# Patient Record
Sex: MALE | Race: WHITE | NOT HISPANIC OR LATINO | Employment: FULL TIME | URBAN - METROPOLITAN AREA
[De-identification: names, ages, dates, MRNs, and addresses within clinical notes are randomized per-mention and may not be internally consistent; named-entity substitution may affect disease eponyms.]

---

## 2022-03-26 ENCOUNTER — OFFICE VISIT (OUTPATIENT)
Dept: URGENT CARE | Facility: PHYSICIAN GROUP | Age: 62
End: 2022-03-26
Payer: COMMERCIAL

## 2022-03-26 ENCOUNTER — HOSPITAL ENCOUNTER (OUTPATIENT)
Dept: RADIOLOGY | Facility: MEDICAL CENTER | Age: 62
End: 2022-03-26
Attending: PHYSICIAN ASSISTANT
Payer: COMMERCIAL

## 2022-03-26 VITALS
OXYGEN SATURATION: 94 % | SYSTOLIC BLOOD PRESSURE: 152 MMHG | RESPIRATION RATE: 20 BRPM | TEMPERATURE: 97.6 F | DIASTOLIC BLOOD PRESSURE: 64 MMHG | BODY MASS INDEX: 33.86 KG/M2 | HEIGHT: 72 IN | WEIGHT: 250 LBS | HEART RATE: 81 BPM

## 2022-03-26 DIAGNOSIS — S92.512A DISPLACED FRACTURE OF PROXIMAL PHALANX OF LEFT LESSER TOE(S), INITIAL ENCOUNTER FOR CLOSED FRACTURE: ICD-10-CM

## 2022-03-26 DIAGNOSIS — S99.922A INJURY OF TOE ON LEFT FOOT, INITIAL ENCOUNTER: ICD-10-CM

## 2022-03-26 PROCEDURE — 73660 X-RAY EXAM OF TOE(S): CPT | Mod: LT

## 2022-03-26 PROCEDURE — 99204 OFFICE O/P NEW MOD 45 MIN: CPT | Performed by: PHYSICIAN ASSISTANT

## 2022-03-26 NOTE — PROGRESS NOTES
Subjective:   Taurus Miles is a 62 y.o. male who presents for Toe Pain (Left foot; pinky toe- today )      HPI  Patient is a 62-year-old male who presents to clinic with complaints of left fifth toe pain onset today.  He states he kicked a wall and developed pain immediately to his fifth toe.  He notes a slight deformity laterally to his fifth toe.  He notes swelling.  The pain is worse with weightbearing and movement of the toe.  He has some numbness to the toe.        Medications:    • This patient does not have an active medication from one of the medication groupers.    Allergies: Patient has no allergy information on record.    Problem List: Taurus Miles does not have a problem list on file.    Surgical History:  No past surgical history on file.    Past Social Hx: Taurus Miles  reports that he has been smoking cigarettes. He has never used smokeless tobacco. He reports current alcohol use. He reports previous drug use.     Past Family Hx:  Taurus Miles family history is not on file.     Problem list, medications, and allergies reviewed by myself today in Epic.     Objective:     /64 (BP Location: Left arm, Patient Position: Sitting, BP Cuff Size: Adult)   Pulse 81   Temp 36.4 °C (97.6 °F) (Temporal)   Resp 20   Ht 1.829 m (6')   Wt 113 kg (250 lb)   SpO2 94%   BMI 33.91 kg/m²     Physical Exam  Vitals reviewed.   Constitutional:       General: He is not in acute distress.     Appearance: Normal appearance. He is not ill-appearing or toxic-appearing.   Eyes:      Conjunctiva/sclera: Conjunctivae normal.      Pupils: Pupils are equal, round, and reactive to light.   Cardiovascular:      Rate and Rhythm: Normal rate.   Pulmonary:      Effort: Pulmonary effort is normal.   Musculoskeletal:      Cervical back: Neck supple.   Feet:      Comments: Left fifth toe: Limited ROM secondary to swelling.  Tenderness to the proximal toe.  Slight lateral deformity.  Mild to moderate edema.  Skin intact.   Sensation intact.  Capillary refill less than 2 seconds.  No other tenderness to the foot.  Skin:     General: Skin is warm and dry.   Neurological:      General: No focal deficit present.      Mental Status: He is alert and oriented to person, place, and time.   Psychiatric:         Mood and Affect: Mood normal.         Behavior: Behavior normal.         RADIOLOGY RESULTS   DX-TOE(S) 2+ LEFT    Result Date: 3/26/2022  3/26/2022 12:54 PM HISTORY/REASON FOR EXAM:  Pain following trauma. TECHNIQUE/EXAM DESCRIPTION AND NUMBER OF VIEWS:  3 views of the LEFT toes. COMPARISON: None FINDINGS: BONE MINERALIZATION: Normal. JOINTS: Preserved. No erosions. FRACTURE: Obliquely oriented, minimally displaced fracture of the fifth proximal phalanx. No definitive intra-articular extension. DISLOCATION: None. SOFT TISSUES: No mass.     Obliquely oriented, minimally displaced fracture of the fifth proximal phalanx. No definitive intra-articular extension.           Diagnosis and associated orders:     1. Displaced fracture of proximal phalanx of left lesser toe(s), initial encounter for closed fracture  - DX-TOE(S) 2+ LEFT; Future  - Referral to Orthopedics       Comments/MDM:     • X-ray results per radiologist interpretation above. I personally reviewed images and radiologist report which showed obliquely oriented, minimally displaced fracture of the fifth proximal phalanx.  Discussed findings with patient.  Patient's toe omar taped and he was placed in a short hard sole boot.  Rest, ice application, elevation, high-dose ibuprofen.  • Urgent referral to orthopedics.       I personally reviewed prior external notes and test results pertinent to today's visit.  Supportive care, natural history, differential diagnoses, and indications for immediate follow-up discussed. Patient expresses understanding and agrees to plan. Patient denies any other questions or concerns.     Follow-up with the primary care physician for recheck,  reevaluation, and consideration of further management.    Please note that this dictation was created using voice recognition software. I have made a reasonable attempt to correct obvious errors, but I expect that there are errors of grammar and possibly content that I did not discover before finalizing the note.    This note was electronically signed by Otoniel Barragan PA-C

## 2023-02-27 ENCOUNTER — HOSPITAL ENCOUNTER (OUTPATIENT)
Dept: RADIOLOGY | Facility: MEDICAL CENTER | Age: 63
End: 2023-02-27
Attending: NURSE PRACTITIONER
Payer: COMMERCIAL

## 2023-02-27 ENCOUNTER — OFFICE VISIT (OUTPATIENT)
Dept: URGENT CARE | Facility: PHYSICIAN GROUP | Age: 63
End: 2023-02-27
Payer: COMMERCIAL

## 2023-02-27 VITALS
WEIGHT: 240 LBS | TEMPERATURE: 97.3 F | HEIGHT: 72 IN | RESPIRATION RATE: 16 BRPM | OXYGEN SATURATION: 96 % | HEART RATE: 70 BPM | SYSTOLIC BLOOD PRESSURE: 134 MMHG | DIASTOLIC BLOOD PRESSURE: 74 MMHG | BODY MASS INDEX: 32.51 KG/M2

## 2023-02-27 DIAGNOSIS — S99.911A RIGHT ANKLE INJURY, INITIAL ENCOUNTER: ICD-10-CM

## 2023-02-27 DIAGNOSIS — S82.54XA CLOSED NONDISPLACED FRACTURE OF MEDIAL MALLEOLUS OF RIGHT TIBIA, INITIAL ENCOUNTER: ICD-10-CM

## 2023-02-27 PROCEDURE — 73610 X-RAY EXAM OF ANKLE: CPT | Mod: RT

## 2023-02-27 PROCEDURE — 99214 OFFICE O/P EST MOD 30 MIN: CPT | Performed by: NURSE PRACTITIONER

## 2023-02-27 RX ORDER — HYDROCODONE BITARTRATE AND ACETAMINOPHEN 5; 325 MG/1; MG/1
2 TABLET ORAL EVERY 6 HOURS PRN
Qty: 24 TABLET | Refills: 0 | Status: SHIPPED | OUTPATIENT
Start: 2023-02-27 | End: 2023-03-02

## 2023-02-27 ASSESSMENT — ENCOUNTER SYMPTOMS
FEVER: 0
FOCAL WEAKNESS: 0
MYALGIAS: 0
SENSORY CHANGE: 0
CHILLS: 0
TINGLING: 0

## 2023-02-27 NOTE — PROGRESS NOTES
Subjective     Taurus Miles is a 63 y.o. male who presents with Ankle Injury ((R) ankle x 1 week and states he slipped on the ice this morning )            HPI  New problem.  Patient is a 63-year-old male who presents with right ankle injury after he slipped and fell on ice this morning.  He reports pain to both sides of his ankle with radiation up to his leg.  He has swelling as well.  He denies any distal paresthesia, knee pain, or foot pain.  He is able to bear weight with pain.  He has not taken any medications for this.    Patient has no known allergies.  No current outpatient medications on file prior to visit.     No current facility-administered medications on file prior to visit.     Social History     Socioeconomic History    Marital status:      Spouse name: Not on file    Number of children: Not on file    Years of education: Not on file    Highest education level: Not on file   Occupational History    Not on file   Tobacco Use    Smoking status: Every Day     Types: Cigarettes    Smokeless tobacco: Never   Vaping Use    Vaping Use: Never used   Substance and Sexual Activity    Alcohol use: Yes     Comment: occ    Drug use: Not Currently    Sexual activity: Not on file   Other Topics Concern    Not on file   Social History Narrative    Not on file     Social Determinants of Health     Financial Resource Strain: Not on file   Food Insecurity: Not on file   Transportation Needs: Not on file   Physical Activity: Not on file   Stress: Not on file   Social Connections: Not on file   Intimate Partner Violence: Not on file   Housing Stability: Not on file     Breast Cancer-related family history is not on file.      Review of Systems   Constitutional:  Negative for chills and fever.   Musculoskeletal:  Positive for joint pain. Negative for myalgias.   Skin: Negative.    Neurological:  Negative for tingling, sensory change and focal weakness.            Objective     /74 (BP Location: Right arm,  Patient Position: Sitting, BP Cuff Size: Large adult)   Pulse 70   Temp 36.3 °C (97.3 °F) (Temporal)   Resp 16   Ht 1.829 m (6')   Wt 109 kg (240 lb)   SpO2 96%   BMI 32.55 kg/m²      Physical Exam  Vitals and nursing note reviewed.   Constitutional:       Appearance: Normal appearance.   Cardiovascular:      Rate and Rhythm: Normal rate and regular rhythm.      Heart sounds: No murmur heard.  Pulmonary:      Effort: Pulmonary effort is normal.      Breath sounds: Normal breath sounds.   Musculoskeletal:      Right ankle: Swelling present. Tenderness present over the lateral malleolus and medial malleolus. Decreased range of motion.      Right Achilles Tendon: Normal.   Skin:     General: Skin is warm and dry.      Findings: No bruising or erythema.   Neurological:      Mental Status: He is alert.                           Assessment & Plan        1. Closed nondisplaced fracture of medial malleolus of right tibia, initial encounter  HYDROcodone-acetaminophen (NORCO) 5-325 MG Tab per tablet    Referral to Orthopedics      2. Right ankle injury, initial encounter  DX-ANKLE 3+ VIEWS RIGHT        Closed  minimally displaced fracture of medial and posterior malleolus.  Tall boot and crutches. Ortho referral.  [place].  Patient is cautioned on sedation potential of narcotic medication; no drinking, driving or operating heavy machinery while on this medication.  Opioid risk and consent for opioid signed  PDMP reviewed, no risk.  No weight bearing.  Differential diagnosis, natural history, supportive care, and indications for immediate follow-up were discussed.

## 2023-08-22 ENCOUNTER — OFFICE VISIT (OUTPATIENT)
Dept: URGENT CARE | Facility: PHYSICIAN GROUP | Age: 63
End: 2023-08-22
Payer: COMMERCIAL

## 2023-08-22 VITALS
RESPIRATION RATE: 16 BRPM | SYSTOLIC BLOOD PRESSURE: 140 MMHG | DIASTOLIC BLOOD PRESSURE: 76 MMHG | TEMPERATURE: 99.1 F | WEIGHT: 247 LBS | HEIGHT: 72 IN | OXYGEN SATURATION: 94 % | BODY MASS INDEX: 33.46 KG/M2 | HEART RATE: 82 BPM

## 2023-08-22 DIAGNOSIS — H10.30 ACUTE CONJUNCTIVITIS, UNSPECIFIED ACUTE CONJUNCTIVITIS TYPE, UNSPECIFIED LATERALITY: ICD-10-CM

## 2023-08-22 DIAGNOSIS — M25.50 MULTIPLE JOINT PAIN: ICD-10-CM

## 2023-08-22 DIAGNOSIS — L30.9 ECZEMA, UNSPECIFIED TYPE: ICD-10-CM

## 2023-08-22 PROCEDURE — 99214 OFFICE O/P EST MOD 30 MIN: CPT | Performed by: PHYSICIAN ASSISTANT

## 2023-08-22 PROCEDURE — 3077F SYST BP >= 140 MM HG: CPT | Performed by: PHYSICIAN ASSISTANT

## 2023-08-22 PROCEDURE — 3078F DIAST BP <80 MM HG: CPT | Performed by: PHYSICIAN ASSISTANT

## 2023-08-22 RX ORDER — METHYLPREDNISOLONE 4 MG/1
TABLET ORAL
Qty: 21 TABLET | Refills: 0 | Status: SHIPPED | OUTPATIENT
Start: 2023-08-22 | End: 2023-10-03

## 2023-08-22 RX ORDER — TRIAMCINOLONE ACETONIDE 1 MG/G
1 CREAM TOPICAL 2 TIMES DAILY
Qty: 80 G | Refills: 0 | Status: SHIPPED | OUTPATIENT
Start: 2023-08-22 | End: 2023-08-29

## 2023-08-22 RX ORDER — OFLOXACIN 3 MG/ML
1 SOLUTION/ DROPS OPHTHALMIC 4 TIMES DAILY
Qty: 5 ML | Refills: 0 | Status: SHIPPED | OUTPATIENT
Start: 2023-08-22 | End: 2023-08-29

## 2023-08-22 ASSESSMENT — ENCOUNTER SYMPTOMS
DOUBLE VISION: 0
CHILLS: 0
EYE DISCHARGE: 1
MYALGIAS: 1
SORE THROAT: 0
SINUS PAIN: 0
HEADACHES: 0
EYE REDNESS: 1
FEVER: 0
WEIGHT LOSS: 0
DIZZINESS: 0
COUGH: 0
TINGLING: 0
EYE PAIN: 0
BLURRED VISION: 0
PHOTOPHOBIA: 0

## 2023-08-22 NOTE — PROGRESS NOTES
Subjective:     CHIEF COMPLAINT  Chief Complaint   Patient presents with    Eye Drainage     With redness x 1 week    Rash     Located on chest, back and arms x 3 weeks.    Pain     States every joint in his body hurts x 1 month       HPI  Taurus Miles is a very pleasant 63 y.o. male who presents to the clinic with eye redness, increased tear production and discharge x1 week.  Patient describes small amounts of purulent drainage throughout the day over the last week.  Eyes feel slightly irritated.  No eye pain or visual change.  He has not been running fever.  No preceding URI.  No contact lens use.  On a separate complaint he has also had a very pruritic rash on his chest, upper arms and upper back x3 weeks.  Rash is dry, erythematous and slightly raised.  He has applied topical Benadryl cream without improvement.  No new medications, soaps or detergents.  No history of eczema or psoriasis.  He is also had multiple joint pain that comes and goes over the last month.  No injury or trauma.  No history of autoimmune disease.  No family history of autoimmune disease.    REVIEW OF SYSTEMS  Review of Systems   Constitutional:  Negative for chills, fever, malaise/fatigue and weight loss.   HENT:  Negative for congestion, sinus pain and sore throat.    Eyes:  Positive for discharge and redness. Negative for blurred vision, double vision, photophobia and pain.   Respiratory:  Negative for cough.    Musculoskeletal:  Positive for joint pain and myalgias.   Skin:  Positive for itching and rash.   Neurological:  Negative for dizziness, tingling and headaches.       PAST MEDICAL HISTORY  There are no problems to display for this patient.      SURGICAL HISTORY  patient denies any surgical history    ALLERGIES  No Known Allergies    CURRENT MEDICATIONS  Home Medications       Reviewed by aLci Thomson P.A.-C. (Physician Assistant) on 08/22/23 at 1410  Med List Status: <None>     Medication Last Dose Status        Patient Jesus  Taking any Medications                           SOCIAL HISTORY  Social History     Tobacco Use    Smoking status: Former     Current packs/day: 1.00     Average packs/day: 1 pack/day for 15.6 years (15.6 ttl pk-yrs)     Types: Cigarettes     Start date: 2008    Smokeless tobacco: Never   Vaping Use    Vaping Use: Never used   Substance and Sexual Activity    Alcohol use: Yes     Alcohol/week: 8.4 oz     Types: 14 Cans of beer per week     Comment: 2 beers per day on average    Drug use: Not Currently    Sexual activity: Not on file       FAMILY HISTORY  History reviewed. No pertinent family history.       Objective:     VITAL SIGNS: BP (!) 140/76 (BP Location: Right arm, Patient Position: Sitting, BP Cuff Size: Adult long)   Pulse 82   Temp 37.3 °C (99.1 °F) (Temporal)   Resp 16   Ht 1.829 m (6')   Wt 112 kg (247 lb)   SpO2 94%   BMI 33.50 kg/m²     PHYSICAL EXAM  Physical Exam  Constitutional:       Appearance: Normal appearance.   HENT:      Head: Normocephalic and atraumatic.      Nose: Nose normal.      Mouth/Throat:      Mouth: Mucous membranes are moist.      Pharynx: No oropharyngeal exudate or posterior oropharyngeal erythema.   Eyes:      General:         Right eye: Discharge present.         Left eye: Discharge present.     Extraocular Movements: Extraocular movements intact.      Pupils: Pupils are equal, round, and reactive to light.      Comments: Bilateral conjunctiva mildly injected.  Small amounts of purulent discharge to the lid margins.  EOMs full intact and pain-free.  PERRLA.  No periorbital swelling, redness or tenderness.   Cardiovascular:      Rate and Rhythm: Normal rate and regular rhythm.      Pulses: Normal pulses.      Heart sounds: Normal heart sounds.   Pulmonary:      Effort: Pulmonary effort is normal.   Musculoskeletal:      Cervical back: Normal range of motion.   Skin:     Findings: Rash present.      Comments: Patient has a dry erythematous raised papular with occasional  plaque-like lesions on his back, chest and upper arms.   Neurological:      General: No focal deficit present.      Mental Status: He is alert and oriented to person, place, and time. Mental status is at baseline.         Assessment/Plan:     1. Acute conjunctivitis, unspecified acute conjunctivitis type, unspecified laterality  - ofloxacin (OCUFLOX) 0.3 % Solution; Administer 1 Drop into both eyes 4 times a day for 7 days.  Dispense: 5 mL; Refill: 0    2. Eczema, unspecified type  - methylPREDNISolone (MEDROL DOSEPAK) 4 MG Tablet Therapy Pack; Follow schedule on package instructions.  Dispense: 21 Tablet; Refill: 0  - triamcinolone acetonide (KENALOG) 0.1 % Cream; Apply 1 Application topically 2 times a day for 7 days.  Dispense: 80 g; Refill: 0  - Referral to establish with [facility] PCP    3. Multiple joint pain  - methylPREDNISolone (MEDROL DOSEPAK) 4 MG Tablet Therapy Pack; Follow schedule on package instructions.  Dispense: 21 Tablet; Refill: 0  - Referral to establish with [facility] PCP      MDM/Comments:    Patient presenting today with a few separate complaints.  Over the last week he has noted bilateral eye redness, irritation and discharge.  On exam there is mild purulent discharge in the lid margins bilaterally.  EOMs full intact and pain-free.  PERRLA.  Normal vision.  We will begin treatment for potential bacterial conjunctivitis with a course of ofloxacin eyedrops.  He is also been experiencing a very pruritic rash over the last month.  On exam rash appears consistent with both psoriasis and eczema.  Denies any history of psoriasis or eczema in the past.  Also experiencing multiple nonspecific joint pain over the last month.  Elected to begin treatment with a course of oral steroids as well as a contingent course of topical triamcinolone.  Placed referral for the patient to follow-up and establish with a PCP for further work-up and evaluation.    Differential diagnosis, natural history, supportive care, and  indications for immediate follow-up discussed. All questions answered. Patient agrees with the plan of care.    Follow-up as needed if symptoms worsen or fail to improve to PCP, Urgent care or Emergency Room.    I have personally reviewed prior external notes and test results pertinent to today's visit.  I have independently reviewed and interpreted all diagnostics ordered during this urgent care acute visit.   Discussed management options (risks,benefits, and alternatives to treatment). Pt expresses understanding and the treatment plan was agreed upon. Questions were encouraged and answered to pt's satisfaction.    Please note that this dictation was created using voice recognition software. I have made a reasonable attempt to correct obvious errors, but I expect that there are errors of grammar and possibly content that I did not discover before finalizing the note.

## 2023-09-30 ENCOUNTER — OFFICE VISIT (OUTPATIENT)
Dept: URGENT CARE | Facility: PHYSICIAN GROUP | Age: 63
End: 2023-09-30
Payer: COMMERCIAL

## 2023-09-30 VITALS
BODY MASS INDEX: 32.22 KG/M2 | TEMPERATURE: 98.4 F | DIASTOLIC BLOOD PRESSURE: 72 MMHG | OXYGEN SATURATION: 95 % | SYSTOLIC BLOOD PRESSURE: 138 MMHG | WEIGHT: 237.88 LBS | HEART RATE: 84 BPM | HEIGHT: 72 IN | RESPIRATION RATE: 18 BRPM

## 2023-09-30 DIAGNOSIS — A53.9 SYPHILIS: ICD-10-CM

## 2023-09-30 PROCEDURE — 3075F SYST BP GE 130 - 139MM HG: CPT | Performed by: PHYSICIAN ASSISTANT

## 2023-09-30 PROCEDURE — 99999 PR NO CHARGE: CPT | Performed by: PHYSICIAN ASSISTANT

## 2023-09-30 PROCEDURE — 3078F DIAST BP <80 MM HG: CPT | Performed by: PHYSICIAN ASSISTANT

## 2023-09-30 NOTE — PROGRESS NOTES
Patient presented to the urgent care today requesting treatment for syphilis.  [facility].  Unfortunately, informed patient that we do not carry the treatment for syphilis here at the urgent care.  I recommend that he go through the [facility].

## 2023-10-03 ENCOUNTER — OFFICE VISIT (OUTPATIENT)
Dept: MEDICAL GROUP | Facility: PHYSICIAN GROUP | Age: 63
End: 2023-10-03
Attending: PHYSICIAN ASSISTANT
Payer: COMMERCIAL

## 2023-10-03 VITALS
DIASTOLIC BLOOD PRESSURE: 86 MMHG | WEIGHT: 240 LBS | TEMPERATURE: 98.3 F | HEIGHT: 72 IN | RESPIRATION RATE: 16 BRPM | SYSTOLIC BLOOD PRESSURE: 142 MMHG | OXYGEN SATURATION: 96 % | BODY MASS INDEX: 32.51 KG/M2 | HEART RATE: 77 BPM

## 2023-10-03 DIAGNOSIS — H61.23 BILATERAL IMPACTED CERUMEN: ICD-10-CM

## 2023-10-03 DIAGNOSIS — R03.0 ELEVATED BLOOD-PRESSURE READING WITHOUT DIAGNOSIS OF HYPERTENSION: ICD-10-CM

## 2023-10-03 DIAGNOSIS — A53.9 SYPHILIS: ICD-10-CM

## 2023-10-03 DIAGNOSIS — H69.93 EUSTACHIAN TUBE DYSFUNCTION, BILATERAL: ICD-10-CM

## 2023-10-03 DIAGNOSIS — H53.8 BLURRY VISION, BILATERAL: ICD-10-CM

## 2023-10-03 DIAGNOSIS — Z00.00 WELLNESS EXAMINATION: ICD-10-CM

## 2023-10-03 PROBLEM — I10 ESSENTIAL HYPERTENSION: Status: ACTIVE | Noted: 2023-10-03

## 2023-10-03 PROCEDURE — 3079F DIAST BP 80-89 MM HG: CPT | Performed by: STUDENT IN AN ORGANIZED HEALTH CARE EDUCATION/TRAINING PROGRAM

## 2023-10-03 PROCEDURE — 3077F SYST BP >= 140 MM HG: CPT | Performed by: STUDENT IN AN ORGANIZED HEALTH CARE EDUCATION/TRAINING PROGRAM

## 2023-10-03 PROCEDURE — 69209 REMOVE IMPACTED EAR WAX UNI: CPT | Performed by: STUDENT IN AN ORGANIZED HEALTH CARE EDUCATION/TRAINING PROGRAM

## 2023-10-03 PROCEDURE — 99214 OFFICE O/P EST MOD 30 MIN: CPT | Mod: 25 | Performed by: STUDENT IN AN ORGANIZED HEALTH CARE EDUCATION/TRAINING PROGRAM

## 2023-10-03 RX ORDER — FLUTICASONE PROPIONATE 50 MCG
2 SPRAY, SUSPENSION (ML) NASAL DAILY
Qty: 16 G | Refills: 1 | Status: SHIPPED | OUTPATIENT
Start: 2023-10-03

## 2023-10-03 SDOH — ECONOMIC STABILITY: TRANSPORTATION INSECURITY

## 2023-10-03 SDOH — ECONOMIC STABILITY: HOUSING INSECURITY

## 2023-10-03 SDOH — HEALTH STABILITY: PHYSICAL HEALTH

## 2023-10-03 SDOH — HEALTH STABILITY: MENTAL HEALTH

## 2023-10-03 ASSESSMENT — ENCOUNTER SYMPTOMS
SHORTNESS OF BREATH: 0
PALPITATIONS: 0
FEVER: 0
CHILLS: 0

## 2023-10-03 ASSESSMENT — PATIENT HEALTH QUESTIONNAIRE - PHQ9: CLINICAL INTERPRETATION OF PHQ2 SCORE: 0

## 2023-10-03 NOTE — PROGRESS NOTES
Subjective:     CC: Establish Care    HPI:   Taurus presents today to establish care.          unaccompanied today    Problem   Syphilis    Per patient likely transmitted in July  RPR and Treponema Pallidum Antibodies positive on recent lab work  Patient was seen at [facility] on 10/2/23 and received Pencillin IM  Has more blood work completed as well     Elevated Blood Pressure Reading in Office Without Diagnosis of Hypertension    Reports having hx of hypertension  Medication:None  Does not check his blood pressure at home     Blurry Vision, Bilateral    For past several months will note fogginess of vision with spots in the vision  Was seen in urgent care previously and was treated for conjunctivitis with antibiotic drops  Last seen by optometrist last year         Health Maintenance: Completed    ROS:  Review of Systems   Constitutional:  Negative for chills and fever.   Respiratory:  Negative for shortness of breath.    Cardiovascular:  Negative for chest pain and palpitations.       Objective:     Exam:  BP (!) 142/86   Pulse 77   Temp 36.8 °C (98.3 °F) (Temporal)   Resp 16   Ht 1.829 m (6')   Wt 109 kg (240 lb)   SpO2 96%   BMI 32.55 kg/m²  Body mass index is 32.55 kg/m².    Physical Exam  Constitutional:       Appearance: Normal appearance.   HENT:      Right Ear: There is impacted cerumen.      Left Ear: There is impacted cerumen.   Cardiovascular:      Rate and Rhythm: Normal rate and regular rhythm.   Pulmonary:      Effort: Pulmonary effort is normal. No respiratory distress.      Breath sounds: Normal breath sounds. No stridor. No wheezing or rhonchi.   Neurological:      Mental Status: He is alert.             Labs:     Assessment & Plan:     63 y.o. male with the following -     1. Syphilis  Had RPR and Treponema Pallidum Antibodies testing completed which both returned positive. Patient was seen at the [facility] and received Penicillin IM. Per patient  he had noted diffuse joint pain and oral ulcers prior to being diagnosed with Syphilis. He had further lab [facility] and labs are pending. Will request records.    2. Blurry vision, bilateral  Chronic, ongoing. Reports noting blurry and foggy vision. Previously was treated for bilateral conjunctivitis. Requesting to be referred to ophthalmology.  - Referral to Ophthalmology    3. Elevated blood-pressure reading without diagnosis of hypertension  Blood pressure elevated at todays visit. Will have patient monitor blood pressures at home and keep a log. If log reveals readings of above 140/90 will consider starting patient on medication after shared decision making.  - Comp Metabolic Panel; Future    4. Eustachian tube dysfunction, bilateral  Likely 2/2 to URI symptoms. Will prescribe Flonase    5. Bilateral impacted cerumen  Procedure: Cerumen Removal  Risks and benefits of procedure discussed with patient.  Cerumen removed with lavage by Dr. Tran. Patient tolerated the procedure well  Post procedure exam with clear canal and normal TM.    6. Wellness examination  Patient to return in 2 weeks for annual well adult  - Comp Metabolic Panel; Future  - Lipid Profile; Future  - CBC WITH DIFFERENTIAL; Future    Patient to f/u in 2 weeks for annual well adult visit

## 2023-10-04 ENCOUNTER — HOSPITAL ENCOUNTER (OUTPATIENT)
Dept: LAB | Facility: MEDICAL CENTER | Age: 63
End: 2023-10-04
Attending: STUDENT IN AN ORGANIZED HEALTH CARE EDUCATION/TRAINING PROGRAM
Payer: COMMERCIAL

## 2023-10-04 DIAGNOSIS — R03.0 ELEVATED BLOOD-PRESSURE READING WITHOUT DIAGNOSIS OF HYPERTENSION: ICD-10-CM

## 2023-10-04 DIAGNOSIS — Z00.00 WELLNESS EXAMINATION: ICD-10-CM

## 2023-10-04 LAB
ALBUMIN SERPL BCP-MCNC: 3.7 G/DL (ref 3.2–4.9)
ALBUMIN/GLOB SERPL: 0.9 G/DL
ALP SERPL-CCNC: 119 U/L (ref 30–99)
ALT SERPL-CCNC: 18 U/L (ref 2–50)
ANION GAP SERPL CALC-SCNC: 8 MMOL/L (ref 7–16)
AST SERPL-CCNC: 16 U/L (ref 12–45)
BASOPHILS # BLD AUTO: 0.6 % (ref 0–1.8)
BASOPHILS # BLD: 0.04 K/UL (ref 0–0.12)
BILIRUB SERPL-MCNC: 0.5 MG/DL (ref 0.1–1.5)
BUN SERPL-MCNC: 10 MG/DL (ref 8–22)
CALCIUM ALBUM COR SERPL-MCNC: 9.8 MG/DL (ref 8.5–10.5)
CALCIUM SERPL-MCNC: 9.6 MG/DL (ref 8.5–10.5)
CHLORIDE SERPL-SCNC: 99 MMOL/L (ref 96–112)
CHOLEST SERPL-MCNC: 184 MG/DL (ref 100–199)
CO2 SERPL-SCNC: 27 MMOL/L (ref 20–33)
CREAT SERPL-MCNC: 0.74 MG/DL (ref 0.5–1.4)
EOSINOPHIL # BLD AUTO: 0.05 K/UL (ref 0–0.51)
EOSINOPHIL NFR BLD: 0.7 % (ref 0–6.9)
ERYTHROCYTE [DISTWIDTH] IN BLOOD BY AUTOMATED COUNT: 47.4 FL (ref 35.9–50)
GFR SERPLBLD CREATININE-BSD FMLA CKD-EPI: 100 ML/MIN/1.73 M 2
GLOBULIN SER CALC-MCNC: 4.2 G/DL (ref 1.9–3.5)
GLUCOSE SERPL-MCNC: 255 MG/DL (ref 65–99)
HCT VFR BLD AUTO: 39.7 % (ref 42–52)
HDLC SERPL-MCNC: 38 MG/DL
HGB BLD-MCNC: 13.2 G/DL (ref 14–18)
IMM GRANULOCYTES # BLD AUTO: 0.02 K/UL (ref 0–0.11)
IMM GRANULOCYTES NFR BLD AUTO: 0.3 % (ref 0–0.9)
LDLC SERPL CALC-MCNC: 127 MG/DL
LYMPHOCYTES # BLD AUTO: 1.8 K/UL (ref 1–4.8)
LYMPHOCYTES NFR BLD: 25.4 % (ref 22–41)
MCH RBC QN AUTO: 30.6 PG (ref 27–33)
MCHC RBC AUTO-ENTMCNC: 33.2 G/DL (ref 32.3–36.5)
MCV RBC AUTO: 92.1 FL (ref 81.4–97.8)
MONOCYTES # BLD AUTO: 0.43 K/UL (ref 0–0.85)
MONOCYTES NFR BLD AUTO: 6.1 % (ref 0–13.4)
NEUTROPHILS # BLD AUTO: 4.75 K/UL (ref 1.82–7.42)
NEUTROPHILS NFR BLD: 66.9 % (ref 44–72)
NRBC # BLD AUTO: 0 K/UL
NRBC BLD-RTO: 0 /100 WBC (ref 0–0.2)
PLATELET # BLD AUTO: 243 K/UL (ref 164–446)
PMV BLD AUTO: 10.6 FL (ref 9–12.9)
POTASSIUM SERPL-SCNC: 4.3 MMOL/L (ref 3.6–5.5)
PROT SERPL-MCNC: 7.9 G/DL (ref 6–8.2)
RBC # BLD AUTO: 4.31 M/UL (ref 4.7–6.1)
SODIUM SERPL-SCNC: 134 MMOL/L (ref 135–145)
TRIGL SERPL-MCNC: 95 MG/DL (ref 0–149)
WBC # BLD AUTO: 7.1 K/UL (ref 4.8–10.8)

## 2023-10-04 PROCEDURE — 36415 COLL VENOUS BLD VENIPUNCTURE: CPT

## 2023-10-04 PROCEDURE — 85025 COMPLETE CBC W/AUTO DIFF WBC: CPT

## 2023-10-04 PROCEDURE — 80061 LIPID PANEL: CPT

## 2023-10-04 PROCEDURE — 80053 COMPREHEN METABOLIC PANEL: CPT

## 2023-10-17 ENCOUNTER — OFFICE VISIT (OUTPATIENT)
Dept: MEDICAL GROUP | Facility: PHYSICIAN GROUP | Age: 63
End: 2023-10-17
Payer: COMMERCIAL

## 2023-10-17 ENCOUNTER — HOSPITAL ENCOUNTER (OUTPATIENT)
Dept: LAB | Facility: MEDICAL CENTER | Age: 63
End: 2023-10-17
Attending: STUDENT IN AN ORGANIZED HEALTH CARE EDUCATION/TRAINING PROGRAM
Payer: COMMERCIAL

## 2023-10-17 VITALS
TEMPERATURE: 97.9 F | HEART RATE: 70 BPM | BODY MASS INDEX: 32.51 KG/M2 | RESPIRATION RATE: 18 BRPM | HEIGHT: 72 IN | SYSTOLIC BLOOD PRESSURE: 126 MMHG | OXYGEN SATURATION: 95 % | DIASTOLIC BLOOD PRESSURE: 78 MMHG | WEIGHT: 240 LBS

## 2023-10-17 DIAGNOSIS — Z00.00 WELLNESS EXAMINATION: ICD-10-CM

## 2023-10-17 DIAGNOSIS — R73.01 FASTING HYPERGLYCEMIA: ICD-10-CM

## 2023-10-17 DIAGNOSIS — Z12.5 SCREENING FOR MALIGNANT NEOPLASM OF PROSTATE: ICD-10-CM

## 2023-10-17 LAB
EST. AVERAGE GLUCOSE BLD GHB EST-MCNC: 232 MG/DL
HBA1C MFR BLD: 9.7 % (ref 4–5.6)
PSA SERPL-MCNC: 0.87 NG/ML (ref 0–4)

## 2023-10-17 PROCEDURE — 3074F SYST BP LT 130 MM HG: CPT | Performed by: STUDENT IN AN ORGANIZED HEALTH CARE EDUCATION/TRAINING PROGRAM

## 2023-10-17 PROCEDURE — 99397 PER PM REEVAL EST PAT 65+ YR: CPT | Performed by: STUDENT IN AN ORGANIZED HEALTH CARE EDUCATION/TRAINING PROGRAM

## 2023-10-17 PROCEDURE — 84153 ASSAY OF PSA TOTAL: CPT

## 2023-10-17 PROCEDURE — 83036 HEMOGLOBIN GLYCOSYLATED A1C: CPT

## 2023-10-17 PROCEDURE — 3078F DIAST BP <80 MM HG: CPT | Performed by: STUDENT IN AN ORGANIZED HEALTH CARE EDUCATION/TRAINING PROGRAM

## 2023-10-17 PROCEDURE — 36415 COLL VENOUS BLD VENIPUNCTURE: CPT

## 2023-10-17 ASSESSMENT — FIBROSIS 4 INDEX: FIB4 SCORE: 1.01

## 2023-10-17 NOTE — PROGRESS NOTES
Subjective:     CC:   Chief Complaint   Patient presents with    Annual Exam       HPI:   Taurus Miles is a 63 y.o. male who presents for an annual exam. He is feeling well and has no complaints.    Health Maintenance  Advanced directive: No  PT for falls prevention: N/A  Cholesterol Screening: Completed on 10/4/23  Diabetes Screening: Completed on 10/4/23 with elevated fasting blood glucose, will order A1c today  AAA Screening: Patient not interested at this time  Aspirin Use: N/A    Anticipatory Guidance  Diet: Has protein shake in the morning, sandwich at lunch, big meal at dinner. Rarely eats out   Exercise: No exercises, but does walk [id number] steps a day  Substance Abuse: Has been cutting down on alcohol, former smoker, no drug use  Safe in relationship.   Seat belts, bike helmet, gun safety discussed.  Sun protection used.  Dental home: a few years ago    Cancer screening  Colorectal Cancer Screening: Not interested at this time  Lung Cancer Screening: Not interested at this time  Prostate Cancer Screening/PSA: Will complete today    Infectious disease screening/Immunizations  --STI Screening: Recently completed  --Practices safe sex.  --HIV Screening: Recently completed,   --Hepatitis C Screening: Recently, completed  --Immunizations:    Influenza: Declined   HPV:  N/A   Tetanus: N/A   Shingles: N/A   Pneumococcal : N/A   Hepatitis B: N/A  COVID-19: N/A  Other immunizations: N/A  He  has a past medical history of Essential hypertension and Kidney stones.  He  has a past surgical history that includes umbilical hernia repair.  Family History   Problem Relation Age of Onset    Diabetes Father     Heart Disease Father     Colorectal Cancer Neg Hx     Cancer Neg Hx      Social History     Tobacco Use    Smoking status: Former     Current packs/day: 0.00     Average packs/day: 1 pack/day for 15.5 years (15.5 ttl pk-yrs)     Types: Cigarettes     Start date: 2008     Quit date: 7/4/2023     Years since quitting:  0.2    Smokeless tobacco: Never   Vaping Use    Vaping Use: Never used   Substance Use Topics    Alcohol use: Yes     Alcohol/week: 8.4 oz     Types: 14 Cans of beer per week     Comment: 2 beers per day on average    Drug use: Not Currently       Patient Active Problem List    Diagnosis Date Noted    Syphilis 10/03/2023    Elevated blood pressure reading in office without diagnosis of hypertension 10/03/2023    Blurry vision, bilateral 10/03/2023       Current Outpatient Medications   Medication Sig Dispense Refill    fluticasone (FLONASE) 50 MCG/ACT nasal spray Administer 2 Sprays into affected nostril(S) every day. 16 g 1     No current facility-administered medications for this visit.    (including changes today)  Allergies: Patient has no known allergies.    Review of Systems   Constitutional: Negative for fever, chills and malaise/fatigue.   HENT: Negative for congestion.    Eyes: Negative for pain.   Respiratory: Negative for cough and shortness of breath.    Cardiovascular: Negative for leg swelling.   Gastrointestinal: Negative for nausea, vomiting, abdominal pain and diarrhea.   Genitourinary: Negative for dysuria and hematuria.   Skin: Negative for rash.   Neurological: Negative for dizziness, focal weakness and headaches.   Endo/Heme/Allergies: Does not bleed easily.   Psychiatric/Behavioral: Negative for depression.  The patient is not nervous/anxious.      Objective:     /78   Pulse 70   Temp 36.6 °C (97.9 °F) (Temporal)   Resp 18   Ht 1.829 m (6')   Wt 109 kg (240 lb)   SpO2 95%   BMI 32.55 kg/m²   Body mass index is 32.55 kg/m².  Wt Readings from Last 4 Encounters:   10/17/23 109 kg (240 lb)   10/03/23 109 kg (240 lb)   09/30/23 108 kg (237 lb 14 oz)   08/22/23 112 kg (247 lb)       Physical Exam:  Constitutional: Well-developed and well-nourished. Not diaphoretic. No distress.   Skin: Skin is warm and dry. No rash noted.  Head: Atraumatic without lesions.  Eyes: Conjunctivae and  extraocular motions are normal. Pupils are equal, round, and reactive to light. No scleral icterus.   Ears:  External ears unremarkable. Tympanic membranes clear and intact.  Nose: Nares patent. Septum midline. Turbinates without erythema nor edema. No discharge.   Mouth/Throat: Dentition is good. Tongue normal. Oropharynx is clear and moist. Posterior pharynx without erythema or exudates.  Neck: Supple, trachea midline. Normal range of motion. No thyromegaly present. No lymphadenopathy--cervical or supraclavicular.  Cardiovascular: Regular rate and rhythm, S1 and S2 without murmur, rubs, or gallops.    Lungs: Effort normal. Clear to auscultation throughout. No adventitious sounds. No CVA tenderness.  Abdomen: Soft, non tender, and without distention. Active bowel sounds in all four quadrants. No rebound, guarding, masses or HSM.  : Genitalia: Deferred  Rectal: deferred  Prostate: deferred  Extremities: No cyanosis, clubbing, erythema, nor edema. Distal pulses intact and symmetric.   Musculoskeletal: All major joints AROM full in all directions without pain.  Neurological: Alert and oriented x 3. DTRs 2+/3 and symmetric. No cranial nerve deficit. 5/5 myotomes. Sensation intact.  Psychiatric:  Behavior, mood, and affect are appropriate.    A chaperone was offered to the patient during today's exam. Patient declined chaperone.    Assessment and Plan:     1. Wellness examination        2. Fasting hyperglycemia  HEMOGLOBIN A1C      3. Screening for malignant neoplasm of prostate  PROSTATE SPECIFIC AG SCREENING          HCM: Discussed.  Labs per orders.  Vaccinations per orders.  Counseling about diet, supplements, exercise, skin care and safe sex.    Referral for genetic research was offered. Patient {declined/accepted}.    Follow-up: Return for Annual.

## 2023-10-17 NOTE — PROGRESS NOTES
Subjective:     CC:   Chief Complaint   Patient presents with    Annual Exam       HPI:   Taurus Miles is a 63 y.o. male who presents for an annual exam. He is feeling well and has no complaints.    Health Maintenance  Advanced directive: No  PT for falls prevention: N/A  Cholesterol Screening: Completed on 10/4/23  Diabetes Screening: Completed on 10/4/23 with elevated fasting blood glucose, will order A1c today  AAA Screening: Patient not interested at this time  Aspirin Use: N/A    Anticipatory Guidance  Diet: Has protein shake in the morning, sandwich at lunch, big meal at dinner. Rarely eats out   Exercise: No exercises, but does walk 7572-0711 steps a day  Substance Abuse: Has been cutting down on alcohol, former smoker, no drug use  Safe in relationship.   Seat belts, bike helmet, gun safety discussed.  Sun protection used.  Dental home: a few years ago    Cancer screening  Colorectal Cancer Screening: Not interested at this time  Lung Cancer Screening: Not interested at this time  Prostate Cancer Screening/PSA: Will complete today    Infectious disease screening/Immunizations  --STI Screening: Recently completed  --Practices safe sex.  --HIV Screening: Recently completed,   --Hepatitis C Screening: Recently, completed  --Immunizations:    Influenza: Declined   HPV:  N/A   Tetanus: N/A   Shingles: N/A   Pneumococcal : N/A   Hepatitis B: N/A  COVID-19: N/A  Other immunizations: N/A    He  has a past medical history of Essential hypertension and Kidney stones.  He  has a past surgical history that includes umbilical hernia repair.  Family History   Problem Relation Age of Onset    Diabetes Father     Heart Disease Father     Colorectal Cancer Neg Hx     Cancer Neg Hx      Social History     Tobacco Use    Smoking status: Former     Current packs/day: 0.00     Average packs/day: 1 pack/day for 15.5 years (15.5 ttl pk-yrs)     Types: Cigarettes     Start date: 2008     Quit date: 7/4/2023     Years since  quittin.2    Smokeless tobacco: Never   Vaping Use    Vaping Use: Never used   Substance Use Topics    Alcohol use: Yes     Alcohol/week: 8.4 oz     Types: 14 Cans of beer per week     Comment: 2 beers per day on average    Drug use: Not Currently       Patient Active Problem List    Diagnosis Date Noted    Syphilis 10/03/2023    Elevated blood pressure reading in office without diagnosis of hypertension 10/03/2023    Blurry vision, bilateral 10/03/2023       Current Outpatient Medications   Medication Sig Dispense Refill    fluticasone (FLONASE) 50 MCG/ACT nasal spray Administer 2 Sprays into affected nostril(S) every day. 16 g 1     No current facility-administered medications for this visit.    (including changes today)  Allergies: Patient has no known allergies.    Review of Systems   Constitutional: Negative for fever, chills and malaise/fatigue.   HENT: Negative for congestion.    Eyes: Negative for pain.   Respiratory: Negative for cough and shortness of breath.    Cardiovascular: Negative for leg swelling.   Gastrointestinal: Negative for nausea, vomiting, abdominal pain and diarrhea.   Genitourinary: Negative for dysuria and hematuria.   Skin: Negative for rash.   Neurological: Negative for dizziness, focal weakness and headaches.   Endo/Heme/Allergies: Does not bleed easily.   Psychiatric/Behavioral: Negative for depression.  The patient is not nervous/anxious.      Objective:     /78   Pulse 70   Temp 36.6 °C (97.9 °F) (Temporal)   Resp 18   Ht 1.829 m (6')   Wt 109 kg (240 lb)   SpO2 95%   BMI 32.55 kg/m²   Body mass index is 32.55 kg/m².  Wt Readings from Last 4 Encounters:   10/17/23 109 kg (240 lb)   10/03/23 109 kg (240 lb)   23 108 kg (237 lb 14 oz)   23 112 kg (247 lb)       Physical Exam:  Constitutional: Well-developed and well-nourished. Not diaphoretic. No distress.   Skin: Skin is warm and dry. No rash noted.  Head: Atraumatic without lesions.  Eyes: Conjunctivae  and extraocular motions are normal. Pupils are equal, round, and reactive to light. No scleral icterus.   Ears:  External ears unremarkable. Tympanic membranes clear and intact.  Nose: Nares patent. Septum midline. Turbinates without erythema nor edema. No discharge.   Mouth/Throat: Dentition is good. Tongue normal. Oropharynx is clear and moist. Posterior pharynx without erythema or exudates.  Neck: Supple, trachea midline. Normal range of motion. No thyromegaly present. No lymphadenopathy--cervical or supraclavicular.  Cardiovascular: Regular rate and rhythm, S1 and S2 without murmur, rubs, or gallops.    Lungs: Effort normal. Clear to auscultation throughout. No adventitious sounds. No CVA tenderness.  Abdomen: Soft, non tender, and without distention. Active bowel sounds in all four quadrants. No rebound, guarding, masses or HSM.  : Genitalia: Negative  Rectal: deferred  Prostate: deferred  Extremities: No cyanosis, clubbing, erythema, nor edema. Distal pulses intact and symmetric.   Musculoskeletal: All major joints AROM full in all directions without pain.  Neurological: Alert and oriented x 3. DTRs 2+/3 and symmetric. No cranial nerve deficit. 5/5 myotomes. Sensation intact.  Psychiatric:  Behavior, mood, and affect are appropriate.    A chaperone was offered to the patient during today's exam. Patient declined chaperone.    Assessment and Plan:     1. Wellness examination        2. Fasting hyperglycemia  HEMOGLOBIN A1C      3. Screening for malignant neoplasm of prostate  PROSTATE SPECIFIC AG SCREENING          HCM: Discussed.  Labs per orders.  Vaccinations per orders.  Counseling about diet, supplements, exercise, skin care and safe sex.      Follow-up: Return for Annual.

## 2023-10-18 DIAGNOSIS — E11.9 TYPE 2 DIABETES MELLITUS NOT AT GOAL (HCC): ICD-10-CM

## 2024-01-01 DIAGNOSIS — E11.9 TYPE 2 DIABETES MELLITUS NOT AT GOAL (HCC): ICD-10-CM

## 2024-01-02 DIAGNOSIS — E11.9 TYPE 2 DIABETES MELLITUS NOT AT GOAL (HCC): ICD-10-CM

## 2024-01-03 NOTE — TELEPHONE ENCOUNTER
Requested Prescriptions     Pending Prescriptions Disp Refills    metformin (GLUCOPHAGE) 1000 MG tablet 180 Tablet 3     Sig: Please take 1 tablet twice a day

## 2024-01-04 ENCOUNTER — HOSPITAL ENCOUNTER (OUTPATIENT)
Dept: LAB | Facility: MEDICAL CENTER | Age: 64
End: 2024-01-04
Attending: STUDENT IN AN ORGANIZED HEALTH CARE EDUCATION/TRAINING PROGRAM
Payer: COMMERCIAL

## 2024-01-04 DIAGNOSIS — E11.9 TYPE 2 DIABETES MELLITUS NOT AT GOAL (HCC): ICD-10-CM

## 2024-01-04 LAB
CREAT UR-MCNC: 132.82 MG/DL
EST. AVERAGE GLUCOSE BLD GHB EST-MCNC: 148 MG/DL
HBA1C MFR BLD: 6.8 % (ref 4–5.6)
MICROALBUMIN UR-MCNC: <1.2 MG/DL
MICROALBUMIN/CREAT UR: NORMAL MG/G (ref 0–30)

## 2024-01-04 PROCEDURE — 82043 UR ALBUMIN QUANTITATIVE: CPT

## 2024-01-04 PROCEDURE — 83036 HEMOGLOBIN GLYCOSYLATED A1C: CPT

## 2024-01-04 PROCEDURE — 36415 COLL VENOUS BLD VENIPUNCTURE: CPT

## 2024-01-04 PROCEDURE — 82570 ASSAY OF URINE CREATININE: CPT

## 2024-12-11 ENCOUNTER — OFFICE VISIT (OUTPATIENT)
Dept: MEDICAL GROUP | Facility: PHYSICIAN GROUP | Age: 64
End: 2024-12-11
Payer: COMMERCIAL

## 2024-12-11 VITALS
BODY MASS INDEX: 32.51 KG/M2 | DIASTOLIC BLOOD PRESSURE: 70 MMHG | OXYGEN SATURATION: 96 % | HEART RATE: 63 BPM | HEIGHT: 72 IN | WEIGHT: 240 LBS | TEMPERATURE: 97 F | SYSTOLIC BLOOD PRESSURE: 124 MMHG

## 2024-12-11 DIAGNOSIS — Z11.59 NEED FOR HEPATITIS C SCREENING TEST: ICD-10-CM

## 2024-12-11 DIAGNOSIS — Z11.3 SCREENING EXAMINATION FOR SEXUALLY TRANSMITTED DISEASE: ICD-10-CM

## 2024-12-11 DIAGNOSIS — Z00.00 WELLNESS EXAMINATION: ICD-10-CM

## 2024-12-11 DIAGNOSIS — E11.9 TYPE 2 DIABETES MELLITUS WITHOUT COMPLICATION, WITHOUT LONG-TERM CURRENT USE OF INSULIN (HCC): ICD-10-CM

## 2024-12-11 LAB
HBA1C MFR BLD: 7.8 % (ref ?–5.8)
POCT INT CON NEG: NEGATIVE
POCT INT CON POS: POSITIVE

## 2024-12-11 PROCEDURE — 99397 PER PM REEVAL EST PAT 65+ YR: CPT | Performed by: STUDENT IN AN ORGANIZED HEALTH CARE EDUCATION/TRAINING PROGRAM

## 2024-12-11 PROCEDURE — 3078F DIAST BP <80 MM HG: CPT | Performed by: STUDENT IN AN ORGANIZED HEALTH CARE EDUCATION/TRAINING PROGRAM

## 2024-12-11 PROCEDURE — 3074F SYST BP LT 130 MM HG: CPT | Performed by: STUDENT IN AN ORGANIZED HEALTH CARE EDUCATION/TRAINING PROGRAM

## 2024-12-11 PROCEDURE — 83036 HEMOGLOBIN GLYCOSYLATED A1C: CPT | Performed by: STUDENT IN AN ORGANIZED HEALTH CARE EDUCATION/TRAINING PROGRAM

## 2024-12-11 SDOH — HEALTH STABILITY: PHYSICAL HEALTH: ON AVERAGE, HOW MANY MINUTES DO YOU ENGAGE IN EXERCISE AT THIS LEVEL?: 120 MIN

## 2024-12-11 SDOH — ECONOMIC STABILITY: TRANSPORTATION INSECURITY
IN THE PAST 12 MONTHS, HAS LACK OF TRANSPORTATION KEPT YOU FROM MEETINGS, WORK, OR FROM GETTING THINGS NEEDED FOR DAILY LIVING?: NO

## 2024-12-11 SDOH — ECONOMIC STABILITY: INCOME INSECURITY: IN THE LAST 12 MONTHS, WAS THERE A TIME WHEN YOU WERE NOT ABLE TO PAY THE MORTGAGE OR RENT ON TIME?: NO

## 2024-12-11 SDOH — HEALTH STABILITY: PHYSICAL HEALTH: ON AVERAGE, HOW MANY DAYS PER WEEK DO YOU ENGAGE IN MODERATE TO STRENUOUS EXERCISE (LIKE A BRISK WALK)?: 7 DAYS

## 2024-12-11 SDOH — ECONOMIC STABILITY: HOUSING INSECURITY
IN THE LAST 12 MONTHS, WAS THERE A TIME WHEN YOU DID NOT HAVE A STEADY PLACE TO SLEEP OR SLEPT IN A SHELTER (INCLUDING NOW)?: NO

## 2024-12-11 SDOH — ECONOMIC STABILITY: FOOD INSECURITY: WITHIN THE PAST 12 MONTHS, THE FOOD YOU BOUGHT JUST DIDN'T LAST AND YOU DIDN'T HAVE MONEY TO GET MORE.: NEVER TRUE

## 2024-12-11 SDOH — ECONOMIC STABILITY: INCOME INSECURITY: HOW HARD IS IT FOR YOU TO PAY FOR THE VERY BASICS LIKE FOOD, HOUSING, MEDICAL CARE, AND HEATING?: NOT VERY HARD

## 2024-12-11 SDOH — ECONOMIC STABILITY: FOOD INSECURITY: WITHIN THE PAST 12 MONTHS, YOU WORRIED THAT YOUR FOOD WOULD RUN OUT BEFORE YOU GOT MONEY TO BUY MORE.: NEVER TRUE

## 2024-12-11 SDOH — ECONOMIC STABILITY: TRANSPORTATION INSECURITY
IN THE PAST 12 MONTHS, HAS THE LACK OF TRANSPORTATION KEPT YOU FROM MEDICAL APPOINTMENTS OR FROM GETTING MEDICATIONS?: NO

## 2024-12-11 SDOH — ECONOMIC STABILITY: TRANSPORTATION INSECURITY
IN THE PAST 12 MONTHS, HAS LACK OF RELIABLE TRANSPORTATION KEPT YOU FROM MEDICAL APPOINTMENTS, MEETINGS, WORK OR FROM GETTING THINGS NEEDED FOR DAILY LIVING?: NO

## 2024-12-11 SDOH — HEALTH STABILITY: MENTAL HEALTH
STRESS IS WHEN SOMEONE FEELS TENSE, NERVOUS, ANXIOUS, OR CAN'T SLEEP AT NIGHT BECAUSE THEIR MIND IS TROUBLED. HOW STRESSED ARE YOU?: TO SOME EXTENT

## 2024-12-11 ASSESSMENT — FIBROSIS 4 INDEX: FIB4 SCORE: 1.02

## 2024-12-11 ASSESSMENT — SOCIAL DETERMINANTS OF HEALTH (SDOH)
WITHIN THE PAST 12 MONTHS, YOU WORRIED THAT YOUR FOOD WOULD RUN OUT BEFORE YOU GOT THE MONEY TO BUY MORE: NEVER TRUE
HOW OFTEN DO YOU ATTENT MEETINGS OF THE CLUB OR ORGANIZATION YOU BELONG TO?: NEVER
HOW OFTEN DO YOU HAVE A DRINK CONTAINING ALCOHOL: 4 OR MORE TIMES A WEEK
IN A TYPICAL WEEK, HOW MANY TIMES DO YOU TALK ON THE PHONE WITH FAMILY, FRIENDS, OR NEIGHBORS?: MORE THAN THREE TIMES A WEEK
DO YOU BELONG TO ANY CLUBS OR ORGANIZATIONS SUCH AS CHURCH GROUPS UNIONS, FRATERNAL OR ATHLETIC GROUPS, OR SCHOOL GROUPS?: NO
HOW HARD IS IT FOR YOU TO PAY FOR THE VERY BASICS LIKE FOOD, HOUSING, MEDICAL CARE, AND HEATING?: NOT VERY HARD
HOW OFTEN DO YOU GET TOGETHER WITH FRIENDS OR RELATIVES?: NEVER
HOW MANY DRINKS CONTAINING ALCOHOL DO YOU HAVE ON A TYPICAL DAY WHEN YOU ARE DRINKING: 1 OR 2
HOW OFTEN DO YOU HAVE SIX OR MORE DRINKS ON ONE OCCASION: LESS THAN MONTHLY
HOW OFTEN DO YOU ATTEND CHURCH OR RELIGIOUS SERVICES?: NEVER
HOW OFTEN DO YOU GET TOGETHER WITH FRIENDS OR RELATIVES?: NEVER
HOW OFTEN DO YOU ATTEND CHURCH OR RELIGIOUS SERVICES?: NEVER
IN THE PAST 12 MONTHS, HAS THE ELECTRIC, GAS, OIL, OR WATER COMPANY THREATENED TO SHUT OFF SERVICE IN YOUR HOME?: NO
DO YOU BELONG TO ANY CLUBS OR ORGANIZATIONS SUCH AS CHURCH GROUPS UNIONS, FRATERNAL OR ATHLETIC GROUPS, OR SCHOOL GROUPS?: NO
IN A TYPICAL WEEK, HOW MANY TIMES DO YOU TALK ON THE PHONE WITH FAMILY, FRIENDS, OR NEIGHBORS?: MORE THAN THREE TIMES A WEEK
HOW OFTEN DO YOU ATTENT MEETINGS OF THE CLUB OR ORGANIZATION YOU BELONG TO?: NEVER

## 2024-12-11 ASSESSMENT — LIFESTYLE VARIABLES
HOW OFTEN DO YOU HAVE SIX OR MORE DRINKS ON ONE OCCASION: LESS THAN MONTHLY
HOW MANY STANDARD DRINKS CONTAINING ALCOHOL DO YOU HAVE ON A TYPICAL DAY: 1 OR 2
HOW OFTEN DO YOU HAVE A DRINK CONTAINING ALCOHOL: 4 OR MORE TIMES A WEEK
SKIP TO QUESTIONS 9-10: 0
AUDIT-C TOTAL SCORE: 5

## 2024-12-11 ASSESSMENT — PATIENT HEALTH QUESTIONNAIRE - PHQ9: CLINICAL INTERPRETATION OF PHQ2 SCORE: 0

## 2024-12-11 NOTE — PROGRESS NOTES
Subjective:     CC:   Chief Complaint   Patient presents with    Annual Exam       HPI:   Tuarus Miles is a 64 y.o. male who presents for an annual exam. He is feeling well and has no complaints.    Health Maintenance  Advanced directive: No  PT for falls prevention: N/A  Cholesterol Screening: Completed on 10/4/23, new order provided today  Diabetes Screening: Completed on 10/4/23, new order provided today  AAA Screening: Patient not interested at this time  Aspirin Use: N/A    Anticipatory Guidance  Diet: Patient reports he has cereal in the morning, sandwich at lunch, and pasta/rice at night.   Exercise: He tries to get in at least 10,000 steps a day  Substance Abuse: Has been cutting down on alcohol, former smoker, no drug use  Safe in relationship.   Seat belts, bike helmet, gun safety discussed.  Sun protection used.  Dental home: needs to schedule an appointment    Cancer screening  Colorectal Cancer Screening: Not interested at this time  Lung Cancer Screening: Not interested at this time  Prostate Cancer Screening/PSA: Completed in 10/2023    Infectious disease screening/Immunizations  --STI Screening: Declined  --Practices safe sex.  --HIV Screening: Recently completed,   --Hepatitis C Screening: Recently, completed  --Immunizations:    Influenza: Declined   HPV:  N/A   Tetanus: N/A   Shingles: N/A   Pneumococcal : N/A   Hepatitis B: N/A  COVID-19: N/A  Other immunizations: N/A    He  has a past medical history of Essential hypertension and Kidney stones.  He  has a past surgical history that includes umbilical hernia repair.  Family History   Problem Relation Age of Onset    Diabetes Father     Heart Disease Father     Colorectal Cancer Neg Hx     Cancer Neg Hx      Social History     Tobacco Use    Smoking status: Former     Current packs/day: 0.00     Average packs/day: 1 pack/day for 15.5 years (15.5 ttl pk-yrs)     Types: Cigarettes     Start date: 2008     Quit date: 7/4/2023     Years since  quittin.4    Smokeless tobacco: Never   Vaping Use    Vaping status: Never Used   Substance Use Topics    Alcohol use: Yes     Alcohol/week: 8.4 oz     Types: 14 Cans of beer per week     Comment: 2 beers per day on average    Drug use: Not Currently       Patient Active Problem List    Diagnosis Date Noted    Syphilis 10/03/2023    Elevated blood pressure reading in office without diagnosis of hypertension 10/03/2023    Blurry vision, bilateral 10/03/2023       Current Outpatient Medications   Medication Sig Dispense Refill    metformin (GLUCOPHAGE) 1000 MG tablet Please take 1 tablet twice a day 180 Tablet 3     No current facility-administered medications for this visit.    (including changes today)  Allergies: Patient has no known allergies.    Review of Systems   Constitutional: Negative for fever, chills and malaise/fatigue.   HENT: Positive for congestion.    Eyes: Negative for pain.   Respiratory: Negative for cough and shortness of breath.    Cardiovascular: Negative for leg swelling.   Gastrointestinal: Negative for nausea, vomiting, abdominal pain and diarrhea.   Genitourinary: Negative for dysuria and hematuria.   Skin: Negative for rash.   Neurological: Negative for dizziness, focal weakness and headaches.   Endo/Heme/Allergies: Does not bleed easily.   Psychiatric/Behavioral: Negative for depression.  \The patient is not nervous/anxious.      Objective:     /70 (BP Location: Right arm, Patient Position: Sitting, BP Cuff Size: Adult)   Pulse 63   Temp 36.1 °C (97 °F)   Ht 1.829 m (6')   Wt 109 kg (240 lb)   SpO2 96%   BMI 32.55 kg/m²   Body mass index is 32.55 kg/m².  Wt Readings from Last 4 Encounters:   24 109 kg (240 lb)   10/17/23 109 kg (240 lb)   10/03/23 109 kg (240 lb)   23 108 kg (237 lb 14 oz)       Physical Exam:  Constitutional: Well-developed and well-nourished. Not diaphoretic. No distress.   Skin: Skin is warm and dry. No rash noted.  Head: Atraumatic without  lesions.  Eyes: Conjunctivae and extraocular motions are normal. Pupils are equal, round, and reactive to light. No scleral icterus.   Ears:  External ears unremarkable. Tympanic membranes clear and intact.  Nose: Nares patent. Septum midline. Turbinates without erythema nor edema. No discharge.   Mouth/Throat: Dentition is good. Tongue normal. Oropharynx is clear and moist. Posterior pharynx without erythema or exudates.  Neck: Supple, trachea midline. Normal range of motion. No thyromegaly present. No lymphadenopathy--cervical or supraclavicular.  Cardiovascular: Regular rate and rhythm, S1 and S2 without murmur, rubs, or gallops.    Lungs: Effort normal. Clear to auscultation throughout. No adventitious sounds. No CVA tenderness.  Abdomen: Soft, non tender, and without distention. Active bowel sounds in all four quadrants. No rebound, guarding, masses or HSM.  : Genitalia: Negative  Rectal: deferred  Prostate: deferred  Extremities: No cyanosis, clubbing, erythema, nor edema. Distal pulses intact and symmetric.   Musculoskeletal: All major joints AROM full in all directions without pain.  Neurological: Alert and oriented x 3. DTRs 2+/3 and symmetric. No cranial nerve deficit. 5/5 myotomes. Sensation intact.  Psychiatric:  Behavior, mood, and affect are appropriate.    A chaperone was offered to the patient during today's exam. Patient declined chaperone.    Assessment and Plan:     1. Wellness examination  Patient presents today for annual preventative wellness visit.  Annual lab orders provided.  Patient declined cancer screenings and immunizations.  - Lipid Profile; Future  - Comp Metabolic Panel; Future    2. Type 2 diabetes mellitus without complication, without long-term current use of insulin (HCC)  Chronic, ongoing. Continue Metformin 1000mg BID.  - POCT  A1C  - Microalbumin Creat Ratio Urine (Lab Collect); Future  - HEMOGLOBIN A1C; Future    3. Need for hepatitis C screening test  - HEP C VIRUS  ANTIBODY; Future    4. Screening examination for sexually transmitted disease  - Chlamydia/GC, PCR (Urine); Future  - HIV AG/AB COMBO ASSAY SCREENING; Future  - T.PALLIDUM AB SALLIE (SCREENING); Future      HCM: Discussed.  Labs per orders.  Vaccinations per orders.  Counseling about diet, supplements, exercise, skin care and safe sex.      Follow-up: Return in about 3 months (around 3/11/2025) for DM.

## 2025-02-11 DIAGNOSIS — E11.9 TYPE 2 DIABETES MELLITUS NOT AT GOAL (HCC): ICD-10-CM

## 2025-02-12 NOTE — TELEPHONE ENCOUNTER
Requested Prescriptions     Signed Prescriptions Disp Refills    metformin (GLUCOPHAGE) 1000 MG tablet 180 Tablet 0     Sig: Please take 1 tablet twice a day     Authorizing Provider: KIRAN LOWRY     Refused Prescriptions Disp Refills    metformin (GLUCOPHAGE) 1000 MG tablet [Pharmacy Med Name: METFORMIN HCL 1,000 MG TABLET] 180 Tablet 3     Sig: TAKE 1 TABLET BY MOUTH TWICE A DAY     Refused By: FRANK ZIMMERMAN     Reason for Refusal: Patient needs appointment.     LOV 12/11/2024    BULL Regan.

## 2025-03-26 ENCOUNTER — HOSPITAL ENCOUNTER (OUTPATIENT)
Dept: LAB | Facility: MEDICAL CENTER | Age: 65
End: 2025-03-26
Attending: STUDENT IN AN ORGANIZED HEALTH CARE EDUCATION/TRAINING PROGRAM
Payer: COMMERCIAL

## 2025-03-26 DIAGNOSIS — E11.9 TYPE 2 DIABETES MELLITUS WITHOUT COMPLICATION, WITHOUT LONG-TERM CURRENT USE OF INSULIN (HCC): ICD-10-CM

## 2025-03-26 DIAGNOSIS — Z11.59 NEED FOR HEPATITIS C SCREENING TEST: ICD-10-CM

## 2025-03-26 DIAGNOSIS — Z11.3 SCREENING EXAMINATION FOR SEXUALLY TRANSMITTED DISEASE: ICD-10-CM

## 2025-03-26 DIAGNOSIS — Z00.00 WELLNESS EXAMINATION: ICD-10-CM

## 2025-03-26 LAB
ALBUMIN SERPL BCP-MCNC: 4.5 G/DL (ref 3.2–4.9)
ALBUMIN/GLOB SERPL: 1.4 G/DL
ALP SERPL-CCNC: 71 U/L (ref 30–99)
ALT SERPL-CCNC: 21 U/L (ref 2–50)
ANION GAP SERPL CALC-SCNC: 13 MMOL/L (ref 7–16)
AST SERPL-CCNC: 20 U/L (ref 12–45)
BILIRUB SERPL-MCNC: 0.5 MG/DL (ref 0.1–1.5)
BUN SERPL-MCNC: 11 MG/DL (ref 8–22)
C TRACH DNA SPEC QL NAA+PROBE: NEGATIVE
CALCIUM ALBUM COR SERPL-MCNC: 9.9 MG/DL (ref 8.5–10.5)
CALCIUM SERPL-MCNC: 10.3 MG/DL (ref 8.5–10.5)
CHLORIDE SERPL-SCNC: 105 MMOL/L (ref 96–112)
CHOLEST SERPL-MCNC: 180 MG/DL (ref 100–199)
CO2 SERPL-SCNC: 25 MMOL/L (ref 20–33)
CREAT SERPL-MCNC: 0.95 MG/DL (ref 0.5–1.4)
CREAT UR-MCNC: 99.5 MG/DL
EST. AVERAGE GLUCOSE BLD GHB EST-MCNC: 166 MG/DL
GFR SERPLBLD CREATININE-BSD FMLA CKD-EPI: 88 ML/MIN/1.73 M 2
GLOBULIN SER CALC-MCNC: 3.3 G/DL (ref 1.9–3.5)
GLUCOSE SERPL-MCNC: 145 MG/DL (ref 65–99)
HBA1C MFR BLD: 7.4 % (ref 4–5.6)
HCV AB SER QL: NORMAL
HDLC SERPL-MCNC: 57 MG/DL
HIV 1+2 AB+HIV1 P24 AG SERPL QL IA: NORMAL
LDLC SERPL CALC-MCNC: 97 MG/DL
MICROALBUMIN UR-MCNC: <1.2 MG/DL
MICROALBUMIN/CREAT UR: NORMAL MG/G (ref 0–30)
N GONORRHOEA DNA SPEC QL NAA+PROBE: NEGATIVE
POTASSIUM SERPL-SCNC: 4.7 MMOL/L (ref 3.6–5.5)
PROT SERPL-MCNC: 7.8 G/DL (ref 6–8.2)
SODIUM SERPL-SCNC: 143 MMOL/L (ref 135–145)
SPECIMEN SOURCE: NORMAL
T PALLIDUM AB SER QL IA: REACTIVE
TRIGL SERPL-MCNC: 132 MG/DL (ref 0–149)

## 2025-03-26 PROCEDURE — 80061 LIPID PANEL: CPT

## 2025-03-26 PROCEDURE — 80053 COMPREHEN METABOLIC PANEL: CPT

## 2025-03-26 PROCEDURE — 86780 TREPONEMA PALLIDUM: CPT

## 2025-03-26 PROCEDURE — 86803 HEPATITIS C AB TEST: CPT

## 2025-03-26 PROCEDURE — 87491 CHLMYD TRACH DNA AMP PROBE: CPT

## 2025-03-26 PROCEDURE — 83036 HEMOGLOBIN GLYCOSYLATED A1C: CPT

## 2025-03-26 PROCEDURE — 82570 ASSAY OF URINE CREATININE: CPT

## 2025-03-26 PROCEDURE — 86592 SYPHILIS TEST NON-TREP QUAL: CPT

## 2025-03-26 PROCEDURE — 86593 SYPHILIS TEST NON-TREP QUANT: CPT

## 2025-03-26 PROCEDURE — 87389 HIV-1 AG W/HIV-1&-2 AB AG IA: CPT

## 2025-03-26 PROCEDURE — 36415 COLL VENOUS BLD VENIPUNCTURE: CPT

## 2025-03-26 PROCEDURE — 87591 N.GONORRHOEAE DNA AMP PROB: CPT

## 2025-03-26 PROCEDURE — 82043 UR ALBUMIN QUANTITATIVE: CPT

## 2025-03-27 ENCOUNTER — HOSPITAL ENCOUNTER (OUTPATIENT)
Dept: LAB | Facility: MEDICAL CENTER | Age: 65
End: 2025-03-27
Attending: STUDENT IN AN ORGANIZED HEALTH CARE EDUCATION/TRAINING PROGRAM
Payer: COMMERCIAL

## 2025-03-27 ENCOUNTER — OFFICE VISIT (OUTPATIENT)
Dept: MEDICAL GROUP | Facility: PHYSICIAN GROUP | Age: 65
End: 2025-03-27
Payer: COMMERCIAL

## 2025-03-27 VITALS
OXYGEN SATURATION: 97 % | HEART RATE: 77 BPM | WEIGHT: 240 LBS | DIASTOLIC BLOOD PRESSURE: 78 MMHG | HEIGHT: 74 IN | SYSTOLIC BLOOD PRESSURE: 138 MMHG | TEMPERATURE: 97.3 F | BODY MASS INDEX: 30.8 KG/M2

## 2025-03-27 DIAGNOSIS — E11.9 TYPE 2 DIABETES MELLITUS NOT AT GOAL (HCC): ICD-10-CM

## 2025-03-27 DIAGNOSIS — Z86.19 HISTORY OF SYPHILIS: ICD-10-CM

## 2025-03-27 LAB
RPR SER QL: REACTIVE
RPR SER-TITR: NORMAL {TITER}

## 2025-03-27 PROCEDURE — 99214 OFFICE O/P EST MOD 30 MIN: CPT | Performed by: STUDENT IN AN ORGANIZED HEALTH CARE EDUCATION/TRAINING PROGRAM

## 2025-03-27 PROCEDURE — 86593 SYPHILIS TEST NON-TREP QUANT: CPT

## 2025-03-27 PROCEDURE — 36415 COLL VENOUS BLD VENIPUNCTURE: CPT

## 2025-03-27 PROCEDURE — 86592 SYPHILIS TEST NON-TREP QUAL: CPT

## 2025-03-27 PROCEDURE — 86780 TREPONEMA PALLIDUM: CPT

## 2025-03-27 PROCEDURE — 3078F DIAST BP <80 MM HG: CPT | Performed by: STUDENT IN AN ORGANIZED HEALTH CARE EDUCATION/TRAINING PROGRAM

## 2025-03-27 PROCEDURE — 3075F SYST BP GE 130 - 139MM HG: CPT | Performed by: STUDENT IN AN ORGANIZED HEALTH CARE EDUCATION/TRAINING PROGRAM

## 2025-03-27 ASSESSMENT — ENCOUNTER SYMPTOMS
FEVER: 0
SHORTNESS OF BREATH: 0
CHILLS: 0
PALPITATIONS: 0

## 2025-03-27 ASSESSMENT — FIBROSIS 4 INDEX: FIB4 SCORE: 1.2

## 2025-03-27 NOTE — PROGRESS NOTES
"Subjective:   Verbal consent was acquired by the patient to use [employer] ambient listening note generation during this visit Yes     CC: Lab review    History of Present Illness  Mr. Miles is a pleasant 64 yo who presents today for lab review.    He is currently on a regimen of metformin 1000 mg, administered twice daily. He has expressed interest in obtaining a refill of this medication.    He has been treated for syphilis in the past.    MEDICATIONS  Metformin    Patient Active Problem List    Diagnosis Date Noted    History of syphilis 10/03/2023    Elevated blood pressure reading in office without diagnosis of hypertension 10/03/2023    Blurry vision, bilateral 10/03/2023       Health Maintenance: Completed    ROS:  Review of Systems   Constitutional:  Negative for chills and fever.   Respiratory:  Negative for shortness of breath.    Cardiovascular:  Negative for chest pain and palpitations.       Objective:     Exam:  /78 (BP Location: Right arm, Patient Position: Sitting, BP Cuff Size: Adult)   Pulse 77   Temp 36.3 °C (97.3 °F)   Ht 1.88 m (6' 2\")   Wt 109 kg (240 lb)   SpO2 97%   BMI 30.81 kg/m²  Body mass index is 30.81 kg/m².    Physical Exam  Constitutional:       Appearance: Normal appearance.   Cardiovascular:      Rate and Rhythm: Normal rate and regular rhythm.   Pulmonary:      Effort: Pulmonary effort is normal. No respiratory distress.      Breath sounds: Normal breath sounds. No stridor. No wheezing or rhonchi.   Neurological:      Mental Status: He is alert.             Labs:    Latest Reference Range & Units 03/26/25 07:47   Sodium 135 - 145 mmol/L 143   Potassium 3.6 - 5.5 mmol/L 4.7   Chloride 96 - 112 mmol/L 105   Co2 20 - 33 mmol/L 25   Anion Gap 7.0 - 16.0  13.0   Glucose 65 - 99 mg/dL 145 (H)   Bun 8 - 22 mg/dL 11   Creatinine 0.50 - 1.40 mg/dL 0.95   GFR (CKD-EPI) >60 mL/min/1.73 m 2 88   Calcium 8.5 - 10.5 mg/dL 10.3   Correct Calcium 8.5 - 10.5 mg/dL 9.9   AST(SGOT) 12 - " 45 U/L 20   ALT(SGPT) 2 - 50 U/L 21   Alkaline Phosphatase 30 - 99 U/L 71   Total Bilirubin 0.1 - 1.5 mg/dL 0.5   Albumin 3.2 - 4.9 g/dL 4.5   Total Protein 6.0 - 8.2 g/dL 7.8   Globulin 1.9 - 3.5 g/dL 3.3   A-G Ratio g/dL 1.4   Glycohemoglobin 4.0 - 5.6 % 7.4 (H)   Estim. Avg Glu mg/dL 166   Cholesterol,Tot 100 - 199 mg/dL 180   Triglycerides 0 - 149 mg/dL 132   HDL >=40 mg/dL 57   LDL <100 mg/dL 97   Micro Alb Creat Ratio 0 - 30 mg/g see below   Creatinine, Urine mg/dL 99.50   Microalbumin, Urine Random mg/dL <1.2   Hepatitis C Antibody Non-Reactive  Non-Reactive   HIV Ag/Ab Combo Assay Non Reactive  Non-Reactive   Syphilis, Treponemal Qual Non-Reactive  Reactive !   C. trachomatis by PCR Negative  Negative   Gc By Dna Probe Negative  Negative   Source  Urine   (H): Data is abnormally high  !: Data is abnormal    Assessment & Plan:     65 y.o. male with the following -     1. Type 2 diabetes mellitus not at goal (HCC)  Chronic, improving.  Patient's A1c has improved from 7.8% to 7.4%.  At this time we will continue metformin 1000 mg twice daily.  Patient to continue to work on his diet and exercise.  Urine microalbumin was normal.  At the next visit we will complete monofilament and retinal screening.  - metformin (GLUCOPHAGE) 1000 MG tablet; Please take 1 tablet twice a day  Dispense: 270 Tablet; Refill: 3    2. History of syphilis  Patient has history of syphilis which was treated.  Screening syphilis lab was reactive, RPR is pending and RPR titer ordered.            Return in about 6 months (around 9/27/2025), or if symptoms worsen or fail to improve, for Chronic Conditions.    Please note that this dictation was created using voice recognition software. I have made every reasonable attempt to correct obvious errors, but I expect that there are errors of grammar and possibly content that I did not discover before finalizing the note.

## 2025-03-28 LAB
RPR SER QL: REACTIVE
RPR SER-TITR: NORMAL {TITER}
T PALLIDUM AB SER QL IA: REACTIVE

## 2025-03-31 ENCOUNTER — RESULTS FOLLOW-UP (OUTPATIENT)
Dept: MEDICAL GROUP | Facility: PHYSICIAN GROUP | Age: 65
End: 2025-03-31